# Patient Record
Sex: MALE | Race: WHITE | NOT HISPANIC OR LATINO | Employment: FULL TIME | URBAN - METROPOLITAN AREA
[De-identification: names, ages, dates, MRNs, and addresses within clinical notes are randomized per-mention and may not be internally consistent; named-entity substitution may affect disease eponyms.]

---

## 2018-03-08 ENCOUNTER — HOSPITAL ENCOUNTER (EMERGENCY)
Facility: CLINIC | Age: 33
Discharge: HOME | End: 2018-03-08
Attending: STUDENT IN AN ORGANIZED HEALTH CARE EDUCATION/TRAINING PROGRAM | Admitting: STUDENT IN AN ORGANIZED HEALTH CARE EDUCATION/TRAINING PROGRAM
Payer: COMMERCIAL

## 2018-03-08 ENCOUNTER — APPOINTMENT (OUTPATIENT)
Dept: MRI IMAGING | Facility: CLINIC | Age: 33
End: 2018-03-08
Attending: STUDENT IN AN ORGANIZED HEALTH CARE EDUCATION/TRAINING PROGRAM
Payer: COMMERCIAL

## 2018-03-08 VITALS
TEMPERATURE: 98.4 F | OXYGEN SATURATION: 97 % | WEIGHT: 185 LBS | HEART RATE: 58 BPM | SYSTOLIC BLOOD PRESSURE: 111 MMHG | RESPIRATION RATE: 16 BRPM | DIASTOLIC BLOOD PRESSURE: 72 MMHG

## 2018-03-08 DIAGNOSIS — R20.2 FACIAL PARESTHESIA: ICD-10-CM

## 2018-03-08 DIAGNOSIS — G44.89 OTHER HEADACHE SYNDROME: ICD-10-CM

## 2018-03-08 LAB
ANION GAP SERPL CALCULATED.3IONS-SCNC: 3 MMOL/L (ref 3–14)
BASOPHILS # BLD AUTO: 0.1 10E9/L (ref 0–0.2)
BASOPHILS NFR BLD AUTO: 0.8 %
BUN SERPL-MCNC: 14 MG/DL (ref 7–30)
CALCIUM SERPL-MCNC: 8.5 MG/DL (ref 8.5–10.1)
CHLORIDE SERPL-SCNC: 107 MMOL/L (ref 94–109)
CO2 SERPL-SCNC: 29 MMOL/L (ref 20–32)
CREAT SERPL-MCNC: 0.94 MG/DL (ref 0.66–1.25)
DIFFERENTIAL METHOD BLD: NORMAL
EOSINOPHIL # BLD AUTO: 0.1 10E9/L (ref 0–0.7)
EOSINOPHIL NFR BLD AUTO: 1.7 %
ERYTHROCYTE [DISTWIDTH] IN BLOOD BY AUTOMATED COUNT: 13.3 % (ref 10–15)
GFR SERPL CREATININE-BSD FRML MDRD: >90 ML/MIN/1.7M2
GLUCOSE BLDC GLUCOMTR-MCNC: 87 MG/DL (ref 70–99)
GLUCOSE SERPL-MCNC: 81 MG/DL (ref 70–99)
HCT VFR BLD AUTO: 42.2 % (ref 40–53)
HGB BLD-MCNC: 14.3 G/DL (ref 13.3–17.7)
IMM GRANULOCYTES # BLD: 0 10E9/L (ref 0–0.4)
IMM GRANULOCYTES NFR BLD: 0.2 %
LITHIUM SERPL-SCNC: <0.2 MMOL/L (ref 0.6–1.2)
LYMPHOCYTES # BLD AUTO: 2 10E9/L (ref 0.8–5.3)
LYMPHOCYTES NFR BLD AUTO: 31.5 %
MCH RBC QN AUTO: 28.8 PG (ref 26.5–33)
MCHC RBC AUTO-ENTMCNC: 33.9 G/DL (ref 31.5–36.5)
MCV RBC AUTO: 85 FL (ref 78–100)
MONOCYTES # BLD AUTO: 0.8 10E9/L (ref 0–1.3)
MONOCYTES NFR BLD AUTO: 11.7 %
NEUTROPHILS # BLD AUTO: 3.5 10E9/L (ref 1.6–8.3)
NEUTROPHILS NFR BLD AUTO: 54.1 %
PLATELET # BLD AUTO: 212 10E9/L (ref 150–450)
POTASSIUM SERPL-SCNC: 3.8 MMOL/L (ref 3.4–5.3)
RBC # BLD AUTO: 4.96 10E12/L (ref 4.4–5.9)
SODIUM SERPL-SCNC: 139 MMOL/L (ref 133–144)
WBC # BLD AUTO: 6.5 10E9/L (ref 4–11)

## 2018-03-08 PROCEDURE — 25000128 H RX IP 250 OP 636: Performed by: STUDENT IN AN ORGANIZED HEALTH CARE EDUCATION/TRAINING PROGRAM

## 2018-03-08 PROCEDURE — 96361 HYDRATE IV INFUSION ADD-ON: CPT | Performed by: STUDENT IN AN ORGANIZED HEALTH CARE EDUCATION/TRAINING PROGRAM

## 2018-03-08 PROCEDURE — 96374 THER/PROPH/DIAG INJ IV PUSH: CPT | Mod: 59 | Performed by: STUDENT IN AN ORGANIZED HEALTH CARE EDUCATION/TRAINING PROGRAM

## 2018-03-08 PROCEDURE — A9585 GADOBUTROL INJECTION: HCPCS | Performed by: STUDENT IN AN ORGANIZED HEALTH CARE EDUCATION/TRAINING PROGRAM

## 2018-03-08 PROCEDURE — 96375 TX/PRO/DX INJ NEW DRUG ADDON: CPT | Performed by: STUDENT IN AN ORGANIZED HEALTH CARE EDUCATION/TRAINING PROGRAM

## 2018-03-08 PROCEDURE — 70553 MRI BRAIN STEM W/O & W/DYE: CPT

## 2018-03-08 PROCEDURE — 85025 COMPLETE CBC W/AUTO DIFF WBC: CPT | Performed by: STUDENT IN AN ORGANIZED HEALTH CARE EDUCATION/TRAINING PROGRAM

## 2018-03-08 PROCEDURE — 99284 EMERGENCY DEPT VISIT MOD MDM: CPT | Mod: Z6 | Performed by: STUDENT IN AN ORGANIZED HEALTH CARE EDUCATION/TRAINING PROGRAM

## 2018-03-08 PROCEDURE — 80048 BASIC METABOLIC PNL TOTAL CA: CPT | Performed by: STUDENT IN AN ORGANIZED HEALTH CARE EDUCATION/TRAINING PROGRAM

## 2018-03-08 PROCEDURE — 00000146 ZZHCL STATISTIC GLUCOSE BY METER IP

## 2018-03-08 PROCEDURE — 80178 ASSAY OF LITHIUM: CPT | Performed by: STUDENT IN AN ORGANIZED HEALTH CARE EDUCATION/TRAINING PROGRAM

## 2018-03-08 PROCEDURE — 99285 EMERGENCY DEPT VISIT HI MDM: CPT | Mod: 25 | Performed by: STUDENT IN AN ORGANIZED HEALTH CARE EDUCATION/TRAINING PROGRAM

## 2018-03-08 RX ORDER — LITHIUM CARBONATE 450 MG
450 TABLET, EXTENDED RELEASE ORAL DAILY
COMMUNITY
End: 2021-05-10

## 2018-03-08 RX ORDER — DIPHENHYDRAMINE HYDROCHLORIDE 50 MG/ML
25 INJECTION INTRAMUSCULAR; INTRAVENOUS ONCE
Status: COMPLETED | OUTPATIENT
Start: 2018-03-08 | End: 2018-03-08

## 2018-03-08 RX ORDER — GADOBUTROL 604.72 MG/ML
8 INJECTION INTRAVENOUS ONCE
Status: COMPLETED | OUTPATIENT
Start: 2018-03-08 | End: 2018-03-08

## 2018-03-08 RX ORDER — KETOROLAC TROMETHAMINE 15 MG/ML
15 INJECTION, SOLUTION INTRAMUSCULAR; INTRAVENOUS ONCE
Status: COMPLETED | OUTPATIENT
Start: 2018-03-08 | End: 2018-03-08

## 2018-03-08 RX ADMIN — PROCHLORPERAZINE EDISYLATE 10 MG: 5 INJECTION INTRAMUSCULAR; INTRAVENOUS at 16:55

## 2018-03-08 RX ADMIN — GADOBUTROL 8 ML: 604.72 INJECTION INTRAVENOUS at 17:51

## 2018-03-08 RX ADMIN — SODIUM CHLORIDE, POTASSIUM CHLORIDE, SODIUM LACTATE AND CALCIUM CHLORIDE 1000 ML: 600; 310; 30; 20 INJECTION, SOLUTION INTRAVENOUS at 16:49

## 2018-03-08 RX ADMIN — KETOROLAC TROMETHAMINE 15 MG: 15 INJECTION, SOLUTION INTRAMUSCULAR; INTRAVENOUS at 16:49

## 2018-03-08 RX ADMIN — DIPHENHYDRAMINE HYDROCHLORIDE 25 MG: 50 INJECTION, SOLUTION INTRAMUSCULAR; INTRAVENOUS at 16:52

## 2018-03-08 NOTE — ED PROVIDER NOTES
History     Chief Complaint   Patient presents with     Numbness     right sided facial numbness since yesterday. no rash. headache     HPI  Albert Ray is a 32 year old male with past medical history which includes PTSD, bipolar disorder, and migraine headaches who presents for evaluation of headache with right-sided facial numbness.  Patient explains that since he returned home from the  he was diagnosed with migraine headaches, but has not had one in a few years.  His typical migraine headaches were achy and bilateral retro-orbital in location but never associated with sensory deficits or weakness.  He developed a headache yesterday behind both eyes similar to migraine headaches he had years ago but also has had developed diminished sensation along the right side of his face.  Patient also notes a twitching sensation along the right side of his cheek and mouth.  He has not taken anything today for his headache stating that it is only mild compared to previous headaches.  Patient denies fever, chills, visual deficits, neck pain, or other neurologic symptoms.      Problem List:    There are no active problems to display for this patient.       Past Medical History:    No past medical history on file.    Past Surgical History:    No past surgical history on file.    Family History:    No family history on file.    Social History:  Marital Status:  Single [1]  Social History   Substance Use Topics     Smoking status: Not on file     Smokeless tobacco: Not on file     Alcohol use Not on file        Medications:      lithium (ESKALITH) 450 MG CR tablet   IBUPROFEN PO         Review of Systems  Constitutional:  Negative for fever or recent illness.  Eye:  Negative for double vision or visual changes from baseline.  Respiratory:  Negative for cough or shortness of breath.  Gastrointestinal:  Negative for nausea or vomiting.  Musculoskeletal:  Negative for neck pain or recent injury.  Neurological: Positive  for achy bilateral retro-orbital headache with diminished sensation along right side of face.  Negative for weakness.  Skin:  Negative for rash.    All others reviewed and are negative.      Physical Exam   BP: 116/79  Pulse: 66  Heart Rate: 68  Temp: 98.4  F (36.9  C)  Resp: 18  Weight: 83.9 kg (185 lb)  SpO2: 99 %      Physical Exam  Constitutional:  Well developed, well nourished.  Appears nontoxic and in no acute distress.  Head:  Normocephalic and atraumatic.  Symmetrical in appearance.  No palpable temporal arteries.  Eyes:  Conjunctivae are normal.  EOMI and denies diplopia.  PERRLA.  Negative for nystagmus.   Ears:  Negative for tenderness of the auricle or tragus.  External auditory canal clear bilaterally and without discharge.  Tympanic membrane without erythema, purulence or bulging/retraction.   Neck:  Neck supple and without nuchal rigidity.  Cardiovascular: RRR.  Respiratory/Chest:  Effort normal, no respiratory distress.   Musculoskeletal:  Moves all extremities spontaneously and without complaint.  Neuro:  Patient is alert and oriented, ambulatory.  Diminished sensation along the right side of face as compared to the left.  Finger nose finger intact and equal bilaterally.  Negative for dysdiadochokinesia with rapid alternating finger movements.  Skin:  Skin is warm and dry, not diaphoretic.  Psych:  Appears to have a normal mood and affect.      ED Course     ED Course     Procedures              Critical Care time:  none               Results for orders placed or performed during the hospital encounter of 03/08/18 (from the past 24 hour(s))   Glucose by meter   Result Value Ref Range    Glucose 87 70 - 99 mg/dL   CBC with platelets differential   Result Value Ref Range    WBC 6.5 4.0 - 11.0 10e9/L    RBC Count 4.96 4.4 - 5.9 10e12/L    Hemoglobin 14.3 13.3 - 17.7 g/dL    Hematocrit 42.2 40.0 - 53.0 %    MCV 85 78 - 100 fl    MCH 28.8 26.5 - 33.0 pg    MCHC 33.9 31.5 - 36.5 g/dL    RDW 13.3 10.0 -  15.0 %    Platelet Count 212 150 - 450 10e9/L    Diff Method Automated Method     % Neutrophils 54.1 %    % Lymphocytes 31.5 %    % Monocytes 11.7 %    % Eosinophils 1.7 %    % Basophils 0.8 %    % Immature Granulocytes 0.2 %    Absolute Neutrophil 3.5 1.6 - 8.3 10e9/L    Absolute Lymphocytes 2.0 0.8 - 5.3 10e9/L    Absolute Monocytes 0.8 0.0 - 1.3 10e9/L    Absolute Eosinophils 0.1 0.0 - 0.7 10e9/L    Absolute Basophils 0.1 0.0 - 0.2 10e9/L    Abs Immature Granulocytes 0.0 0 - 0.4 10e9/L   Basic metabolic panel   Result Value Ref Range    Sodium 139 133 - 144 mmol/L    Potassium 3.8 3.4 - 5.3 mmol/L    Chloride 107 94 - 109 mmol/L    Carbon Dioxide 29 20 - 32 mmol/L    Anion Gap 3 3 - 14 mmol/L    Glucose 81 70 - 99 mg/dL    Urea Nitrogen 14 7 - 30 mg/dL    Creatinine 0.94 0.66 - 1.25 mg/dL    GFR Estimate >90 >60 mL/min/1.7m2    GFR Estimate If Black >90 >60 mL/min/1.7m2    Calcium 8.5 8.5 - 10.1 mg/dL   Lithium level   Result Value Ref Range    Lithium Level <0.20 (L) 0.60 - 1.20 mmol/L   MR Brain w/o & w Contrast    Narrative    MRI BRAIN WITHOUT AND WITH CONTRAST  3/8/2018 5:51 PM    HISTORY: Right-sided facial paresthesias, headache.    TECHNIQUE:  Multiplanar, multisequence MRI of the brain without and  with 8mL Gadavist.    COMPARISON: None.    FINDINGS: There is no evidence of hemorrhage, mass, acute infarct, or  anomaly. The brain parenchyma, ventricles and subarachnoid spaces  appear normal.     There is no abnormal intracranial enhancement.     The facial structures appear normal. The arteries at the base of the  brain and the dural venous sinuses appear patent.      Impression    IMPRESSION:  Normal MRI of the brain.      CHELSEA ELIZABETH MD       Assessments & Plan (with Medical Decision Making)   Albert Ray is a 32 year old male who presents to the department complaining of retro-orbital headache consistent with mild migraine headaches which he has suffered from in the past, however he has also  "developed a sensation along the right side of his face as compared to the left.  Comorbidities include bipolar disorder and migraine headaches, but no formal diagnosis of complex migraines or neurologic deficits.  Differential diagnosis included ischemic stroke, multiple sclerosis, peripheral nerve palsy, and migraine headache.  MRI imaging was ordered and without sign of infarct, mass, or white matter changes.  He received a \"migraine cocktail\" in the department and headache completely resolved but admits that right facial symptoms remain.  He does have sensation along the right side of his face but believes it is diminished relative to the left.  He has no sign of Bell's palsy or Abiodun's Hunt.  It is possible that his symptoms are related to his migraine headache.  Recommend close monitoring for developing symptoms and also plan for follow-up with primary care provider for reevaluation.  He was also given contact information for neurology clinic.      Disclaimer:  This note consists of symbols derived from keyboarding, dictation, and/or voice recognition software.  As a result, there may be errors in the script that have gone undetected.  Please consider this when interpreting information found in the chart.        I have reviewed the nursing notes.    I have reviewed the findings, diagnosis, plan and need for follow up with the patient.       Discharge Medication List as of 3/8/2018  7:18 PM          Final diagnoses:   Other headache syndrome   Facial paresthesia       3/8/2018   [facility] EMERGENCY DEPARTMENT     Godwin Villagomez DO  03/08/18 2329    "

## 2018-03-08 NOTE — ED AVS SNAPSHOT
[facility] Emergency Department    [address] [address]    Phone:  [phone]    Fax:  [phone]                                       Albert Ray   MRN: [id number]    Department:  [facility] Emergency Department   Date of Visit:  3/8/2018           After Visit Summary Signature Page     I have received my discharge instructions, and my questions have been answered. I have discussed any challenges I see with this plan with the nurse or doctor.    ..........................................................................................................................................  Patient/Patient Representative Signature      ..........................................................................................................................................  Patient Representative Print Name and Relationship to Patient    ..................................................               ................................................  Date                                            Time    ..........................................................................................................................................  Reviewed by Signature/Title    ...................................................              ..............................................  Date                                                            Time

## 2018-03-08 NOTE — ED NOTES
Pt has had a headache behind his eyes (pressure) that started yesterday, rates pain 3/10 with nausea. BS: 87. Has HX of migraines but this feels different with it being in his eyes. Has tried ibuprofen with no help. Pt is a/o x 4. Having tingling on rt side of face and numbness. Is having sharp pain down his legs but feels this is from his back which is not new for him. Denies fever/chills, cough/SOB or CP. Is eating and drinking normal

## 2018-03-08 NOTE — ED AVS SNAPSHOT
[facility] Emergency Department    [address] [address]    Phone:  [phone]    Fax:  [phone]                                       Albert Ray   MRN: [id number]    Department:  [facility] Emergency Department   Date of Visit:  3/8/2018           Patient Information     Date Of Birth          1985        Your diagnoses for this visit were:     Other headache syndrome     Facial paresthesia        You were seen by Godwin Villagomez DO.      Follow-up Information     Follow up with [facility]. Schedule an appointment as soon as possible for a visit in 3 days.    Specialty:  Neurology    Why:  Followup for reevaluation if symptoms persist.    Contact information:    [address] [place] [id number] [phone]    Additional information:    The medical center is located at   [address] [place] [address] (between [facility] and   [facility] in [place], four miles north   of [place]).        Schedule an appointment as soon as possible for a visit with [facility].    Why:  Follow-up for reevaluation if unable to be seen by neurology clinic.    Contact information:    [address] [place] [address] [phone]        Discharge Instructions          * HEADACHE [unspecified]    The cause of your headache today is not clear, but it does not appear to be the sign of any serious illness.  Under stress, some people tense the muscles of their shoulder, neck and scalp without knowing it. If this condition lasts long enough, a TENSION HEADACHE can occur.  A MIGRAINE HEADACHE is caused by changes in blood flow to the brain. It can be mild or severe. A migraine attack may be triggered by emotional stress, hormone changes during the menstrual cycle, oral contraceptives, alcohol use, certain foods containing tyramine, eye strain, weather changes, missing meals, lack of sleep or oversleeping.  Other causes of headache include a  viral illness, sinus, ear or throat infection, dental pain and TMJ (jaw joint) pain.  HOME CARE:    If you were given pain medicine for this headache, do not drive yourself home. Arrange for a ride, instead. When you get home, try to sleep. You should feel much better when you wake up.    If you are having nausea or vomiting, follow a light diet until your headache is relieved.    If you have a migraine type headache, use sunglasses when in the daylight or around bright indoor lighting until symptoms improve. Bright glaring light can worsen this kind of headache.  FOLLOW UP with your doctor if the headache is not better within the next 24 hours. If you have frequent headaches you should discuss a treatment plan with your primary care doctor. By being aware of the earliest signs of headache, and starting treatment right away, you may be able to stop the pain yourself.  GET PROMPT MEDICAL ATTENTION if any of the following occur:    Worsening of your head pain or no improvement within 24 hours    Repeated vomiting (unable to keep liquids down)    Fever over 101 F (38.3 C)    Stiff neck    Extreme drowsiness, confusion or fainting    Weakness of an arm or leg or one side of the face    Difficulty with speech or vision    0772-9482 [employer]. [address] [place] [address], [place] [address]. All rights reserved. This information is not intended as a substitute for professional medical care. Always follow your healthcare professional's instructions.  This information has been modified by your health care provider with permission from the publisher.      24 Hour Appointment Hotline       To make an appointment at any [facility], call [phone] ([phone]). If you don't have a family doctor or clinic, we will help you find one. [facility] clinics are conveniently located to serve the needs of you and your family.             Review of your medicines      Our records show that you are taking the  medicines listed below. If these are incorrect, please call your family doctor or clinic.        Dose / Directions Last dose taken    IBUPROFEN PO   Dose:  400 mg        Take 400 mg by mouth every 6 hours as needed for moderate pain   Refills:  0        lithium 450 MG CR tablet   Commonly known as:  ESKALITH   Dose:  450 mg        Take 450 mg by mouth daily   Refills:  0                Procedures and tests performed during your visit     Basic metabolic panel    CBC with platelets differential    Glucose by meter    Lithium level    MR Brain w/o & w Contrast    Peripheral IV catheter    Peripheral IV: Standard      Orders Needing Specimen Collection     None      Pending Results     No orders found from 3/6/2018 to 3/9/2018.            Pending Culture Results     No orders found from 3/6/2018 to 3/9/2018.            Pending Results Instructions     If you had any lab results that were not finalized at the time of your Discharge, you can call the ED Lab Result RN at [phone]. You will be contacted by this team for any positive Lab results or changes in treatment. The nurses are available 7 days a week from 10A to 6:30P.  You can leave a message 24 hours per day and they will return your call.        Test Results From Your Hospital Stay        3/8/2018  4:00 PM      Component Results     Component Value Ref Range & Units Status    WBC 6.5 4.0 - 11.0 10e9/L Final    RBC Count 4.96 4.4 - 5.9 10e12/L Final    Hemoglobin 14.3 13.3 - 17.7 g/dL Final    Hematocrit 42.2 40.0 - 53.0 % Final    MCV 85 78 - 100 fl Final    MCH 28.8 26.5 - 33.0 pg Final    MCHC 33.9 31.5 - 36.5 g/dL Final    RDW 13.3 10.0 - 15.0 % Final    Platelet Count 212 150 - 450 10e9/L Final    Diff Method Automated Method  Final    % Neutrophils 54.1 % Final    % Lymphocytes 31.5 % Final    % Monocytes 11.7 % Final    % Eosinophils 1.7 % Final    % Basophils 0.8 % Final    % Immature Granulocytes 0.2 % Final    Absolute Neutrophil 3.5 1.6 - 8.3 10e9/L  Final    Absolute Lymphocytes 2.0 0.8 - 5.3 10e9/L Final    Absolute Monocytes 0.8 0.0 - 1.3 10e9/L Final    Absolute Eosinophils 0.1 0.0 - 0.7 10e9/L Final    Absolute Basophils 0.1 0.0 - 0.2 10e9/L Final    Abs Immature Granulocytes 0.0 0 - 0.4 10e9/L Final         3/8/2018  4:10 PM      Component Results     Component Value Ref Range & Units Status    Sodium 139 133 - 144 mmol/L Final    Potassium 3.8 3.4 - 5.3 mmol/L Final    Chloride 107 94 - 109 mmol/L Final    Carbon Dioxide 29 20 - 32 mmol/L Final    Anion Gap 3 3 - 14 mmol/L Final    Glucose 81 70 - 99 mg/dL Final    Urea Nitrogen 14 7 - 30 mg/dL Final    Creatinine 0.94 0.66 - 1.25 mg/dL Final    GFR Estimate >90 >60 mL/min/1.7m2 Final    Non  GFR Calc    GFR Estimate If Black >90 >60 mL/min/1.7m2 Final    African American GFR Calc    Calcium 8.5 8.5 - 10.1 mg/dL Final         3/8/2018  3:57 PM      Component Results     Component Value Ref Range & Units Status    Glucose 87 70 - 99 mg/dL Final         3/8/2018  6:48 PM      Narrative     MRI BRAIN WITHOUT AND WITH CONTRAST  3/8/2018 5:51 PM    HISTORY: Right-sided facial paresthesias, headache.    TECHNIQUE:  Multiplanar, multisequence MRI of the brain without and  with 8mL Gadavist.    COMPARISON: None.    FINDINGS: There is no evidence of hemorrhage, mass, acute infarct, or  anomaly. The brain parenchyma, ventricles and subarachnoid spaces  appear normal.     There is no abnormal intracranial enhancement.     The facial structures appear normal. The arteries at the base of the  brain and the dural venous sinuses appear patent.        Impression     IMPRESSION:  Normal MRI of the brain.      CHELSEA ELIZABETH MD         3/8/2018  4:56 PM      Component Results     Component Value Ref Range & Units Status    Lithium Level <0.20 (L) 0.60 - 1.20 mmol/L Final                Thank you for choosing [facility]       Thank you for choosing [facility] for your care. Our goal is always to provide you with  "excellent care. Hearing back from our patients is one way we can continue to improve our services. Please take a few minutes to complete the written survey that you may receive in the mail after you visit with us. Thank you!        [employer] Information     [employer] lets you send messages to your doctor, view your test results, renew your prescriptions, schedule appointments and more. To sign up, go to [email or web address] . Click on \"Log in\" on the left side of the screen, which will take you to the Welcome page. Then click on \"Sign up Now\" on the right side of the page.     You will be asked to enter the access code listed below, as well as some personal information. Please follow the directions to create your username and password.     Your access code is: [email or web address]  Expires: 2018  7:18 PM     Your access code will  in 90 days. If you need help or a new code, please call your [facility] clinic or [phone].        Care EveryWhere ID     This is your Care EveryWhere ID. This could be used by other organizations to access your [facility] medical records  [id number]        Equal Access to Services     JOSHUA ADAME AH: Hadii annamaria Bah, warene baum, qavan kaalmadavid rene, angela potts. So [facility] [phone].    ATENCIÓN: Si habla español, tiene a cochran disposición servicios gratuitos de asistencia lingüística. Cheryl al [phone].    We comply with applicable federal civil rights laws and [place] laws. We do not discriminate on the basis of race, color, national origin, age, disability, sex, sexual orientation, or gender identity.            After Visit Summary       This is your record. Keep this with you and show to your community pharmacist(s) and doctor(s) at your next visit.                  "

## 2018-03-09 NOTE — DISCHARGE INSTRUCTIONS
* HEADACHE [unspecified]    The cause of your headache today is not clear, but it does not appear to be the sign of any serious illness.  Under stress, some people tense the muscles of their shoulder, neck and scalp without knowing it. If this condition lasts long enough, a TENSION HEADACHE can occur.  A MIGRAINE HEADACHE is caused by changes in blood flow to the brain. It can be mild or severe. A migraine attack may be triggered by emotional stress, hormone changes during the menstrual cycle, oral contraceptives, alcohol use, certain foods containing tyramine, eye strain, weather changes, missing meals, lack of sleep or oversleeping.  Other causes of headache include a viral illness, sinus, ear or throat infection, dental pain and TMJ (jaw joint) pain.  HOME CARE:    If you were given pain medicine for this headache, do not drive yourself home. Arrange for a ride, instead. When you get home, try to sleep. You should feel much better when you wake up.    If you are having nausea or vomiting, follow a light diet until your headache is relieved.    If you have a migraine type headache, use sunglasses when in the daylight or around bright indoor lighting until symptoms improve. Bright glaring light can worsen this kind of headache.  FOLLOW UP with your doctor if the headache is not better within the next 24 hours. If you have frequent headaches you should discuss a treatment plan with your primary care doctor. By being aware of the earliest signs of headache, and starting treatment right away, you may be able to stop the pain yourself.  GET PROMPT MEDICAL ATTENTION if any of the following occur:    Worsening of your head pain or no improvement within 24 hours    Repeated vomiting (unable to keep liquids down)    Fever over 101 F (38.3 C)    Stiff neck    Extreme drowsiness, confusion or fainting    Weakness of an arm or leg or one side of the face    Difficulty with speech or vision    [id number] [facility]  [employer]. [address] [place] [address]. All rights reserved. This information is not intended as a substitute for professional medical care. Always follow your healthcare professional's instructions.  This information has been modified by your health care provider with permission from the publisher.

## 2018-04-30 ENCOUNTER — OFFICE VISIT (OUTPATIENT)
Dept: FAMILY MEDICINE | Facility: CLINIC | Age: 33
End: 2018-04-30
Payer: COMMERCIAL

## 2018-04-30 VITALS
DIASTOLIC BLOOD PRESSURE: 69 MMHG | SYSTOLIC BLOOD PRESSURE: 102 MMHG | BODY MASS INDEX: 26.01 KG/M2 | WEIGHT: 192 LBS | HEART RATE: 66 BPM | TEMPERATURE: 97.2 F | HEIGHT: 72 IN

## 2018-04-30 DIAGNOSIS — Z30.40 ENCOUNTER FOR SURVEILLANCE OF CONTRACEPTIVES, UNSPECIFIED CONTRACEPTIVE: Primary | ICD-10-CM

## 2018-04-30 PROCEDURE — 99213 OFFICE O/P EST LOW 20 MIN: CPT | Performed by: FAMILY MEDICINE

## 2018-04-30 NOTE — PROGRESS NOTES
SUBJECTIVE:   Albert Ray is a 32 year old male who presents to clinic today for the following health issues:      Chief Complaint   Patient presents with     Consult     Patient is here for a vasectomy consultation.         Current Outpatient Prescriptions:      IBUPROFEN PO, Take 400 mg by mouth every 6 hours as needed for moderate pain, Disp: , Rfl:      lithium (ESKALITH) 450 MG CR tablet, Take 450 mg by mouth daily, Disp: , Rfl:     There is no problem list on file for this patient.      Current Outpatient Prescriptions:      IBUPROFEN PO, Take 400 mg by mouth every 6 hours as needed for moderate pain, Disp: , Rfl:      lithium (ESKALITH) 450 MG CR tablet, Take 450 mg by mouth daily, Disp: , Rfl:       SUBJECTIVE:  This 32 year old male is in for a vasectomy consultation.  He and his partner have decided they don't want to have any more children. They have decided that he will have the sterilization procedure instead of her.  Patient was given information to read prior to the consultation.      No surgery on the groin. No allergies to iodine or Lidocaine. No bleeding disorders for him or in the family. He is  and his wife agrees.     We talked about the risks and benefits of the vasectomy; risks being that of potential for bleeding, infection, postoperative discomfort immediately which can last for a number of weeks afterwards, also the development of spermatoceles or sperm granulomas, epididymal cysts and the possibility of failure of the procedure producing failed attempt at sterility.     Also mentioned to the patient that there is some literature out there that suggests men who have vasectomies are at increased risk for prostate cancer; however, this literature is inconclusive and controversial.  It is recommended that men who have vasectomies should have annual prostate exams through the rectum yearly after age 40 and also may benefit from a screening prostatic specific antigen test after age  "40.  We also discussed signs and symptoms of prostatic enlargement or prostatic problems.     I went through the procedure with the patient, how it is done, a midline incision in the scrotum measuring approximately 1/2 inch in length.  The vas deferens is brought up through this incision, dissected free and a small portion, maybe 0.5 cm. in length is removed.  Each end is tied with an absorbable suture, cauterized and then the proximal or distal end is buried away from the other.  Patient had no questions when it came to the procedure itself.  I did show him pictures and diagrams of the procedure.  I showed him where a potential spermatocele or sperm granuloma can occur.      Again, the patient had no further questions for me.    OBJECTIVE: Blood pressure 102/69, pulse 66, temperature 97.2  F (36.2  C), temperature source Tympanic, height 5' 11.5\" (1.816 m), weight 192 lb (87.1 kg).    On exam, this is a well-developed, well-nourished white male.      Exam reveals a normal circumcised penis, no lesions.  Testes are descended bilaterally.  Exam was limited to the genitalia.  Both vas deferens were easily identified.  No other abnormalities were noted.      ASSESSMENT:  Undesired fertility in an adult male, requesting vasectomy.    PLAN:  He will schedule a vasectomy at his convenience for either the last appointment of the morning or on a Thursday or Friday afternoon.  He is aware that he will need to take the entire weekend off and have essentially bedrest for two days with ice packs every two hours and ibuprofen for discomfort.   If he has any further questions, he will contact me prior to the procedure or ask me on the day of the procedure.  He also is aware that he will need to bring a specimen after 10-12 ejaculations to make sure that he has cleared the storage vesicles of any residual sperm and to make sure that he is sterile.       Humberto Scott                "

## 2018-04-30 NOTE — PATIENT INSTRUCTIONS
Thank you for choosing [facility].  You may be receiving a survey in the mail from [employer]ie [employer] regarding your visit today.  Please take a few minutes to complete and return the survey to let us know how we are doing.      If you have questions or concerns, please contact us via MapMyID or you can contact your care team at [phone].    Our Clinic hours are:  Monday 6:40 am  to 7:00 pm  Tuesday -Friday 6:40 am to 5:00 pm    The [place] outpatient lab hours are:  Monday - Friday 6:10 am to 4:45 pm  Saturdays 7:00 am to 11:00 am  Appointments are required, call [phone]    If you have clinical questions after hours or would like to schedule an appointment,  call the clinic at [phone].      ASSESSMENT:  Undesired fertility in an adult male, requesting vasectomy.    PLAN:  He will schedule a vasectomy at his convenience for either the last appointment of the morning or on a Thursday or Friday afternoon.  He is aware that he will need to take the entire weekend off and have essentially bedrest for two days with ice packs every two hours and ibuprofen for discomfort.   If he has any further questions, he will contact me prior to the procedure or ask me on the day of the procedure.  He also is aware that he will need to bring a specimen after 10-12 ejaculations to make sure that he has cleared the storage vesicles of any residual sperm and to make sure that he is sterile.

## 2018-04-30 NOTE — MR AVS SNAPSHOT
After Visit Summary   4/30/2018    Albert Ray    MRN: [id number]           Patient Information     Date Of Birth          1985        Visit Information        Provider Department      4/30/2018 7:00 AM Humberto Scott MD [facility]        Today's Diagnoses     Encounter for surveillance of contraceptives, unspecified contraceptive    -  1      Care Instructions          Thank you for choosing [facility].  You may be receiving a survey in the mail from [facility] regarding your visit today.  Please take a few minutes to complete and return the survey to let us know how we are doing.      If you have questions or concerns, please contact us via PredictAd or you can contact your care team at [phone].    Our Clinic hours are:  Monday 6:40 am  to 7:00 pm  Tuesday -Friday 6:40 am to 5:00 pm    The [place] outpatient lab hours are:  Monday - Friday 6:10 am to 4:45 pm  Saturdays 7:00 am to 11:00 am  Appointments are required, call [phone]    If you have clinical questions after hours or would like to schedule an appointment,  call the clinic at [phone].      ASSESSMENT:  Undesired fertility in an adult male, requesting vasectomy.    PLAN:  He will schedule a vasectomy at his convenience for either the last appointment of the morning or on a Thursday or Friday afternoon.  He is aware that he will need to take the entire weekend off and have essentially bedrest for two days with ice packs every two hours and ibuprofen for discomfort.   If he has any further questions, he will contact me prior to the procedure or ask me on the day of the procedure.  He also is aware that he will need to bring a specimen after 10-12 ejaculations to make sure that he has cleared the storage vesicles of any residual sperm and to make sure that he is sterile.               Follow-ups after your visit        Who to contact     If you have questions or need follow up information about  "today's clinic visit or your schedule please contact [facility] directly at [phone].  Normal or non-critical lab and imaging results will be communicated to you by MyChart, letter or phone within 4 business days after the clinic has received the results. If you do not hear from us within 7 days, please contact the clinic through [employer]eg[employer] System[employer]art or phone. If you have a critical or abnormal lab result, we will notify you by phone as soon as possible.  Submit refill requests through [employer]kids or call your pharmacy and they will forward the refill request to us. Please allow 3 business days for your refill to be completed.          Additional Information About Your Visit        Teg[employer]art Information     [employer] lets you send messages to your doctor, view your test results, renew your prescriptions, schedule appointments and more. To sign up, go to [email or web address] . Click on \"Log in\" on the left side of the screen, which will take you to the Welcome page. Then click on \"Sign up Now\" on the right side of the page.     You will be asked to enter the access code listed below, as well as some personal information. Please follow the directions to create your username and password.     Your access code is: [email or web address]  Expires: 2018  8:18 PM     Your access code will  in 90 days. If you need help or a new code, please call your [place] clinic or [phone].        Care EveryWhere ID     This is your Care EveryWhere ID. This could be used by other organizations to access your [place] medical records  [id number]        Your Vitals Were     Pulse Temperature Height BMI (Body Mass Index)          66 97.2  F (36.2  C) (Tympanic) 5' 11.5\" (1.816 m) 26.41 kg/m2         Blood Pressure from Last 3 Encounters:   18 102/69   18 111/72    Weight from Last 3 Encounters:   18 192 lb (87.1 kg)   18 185 lb (83.9 kg)              Today, you had the following     No orders found for " display       Primary Care Provider Fax #    Physician No Ref-Primary [phone]       No address on file        Equal Access to Services     ESTEPHANIA ADAME : Hadii aad ku haddavidsonmichael Bah, beckydavid vazquezsamirha, mike maryannmerledavid dominguezangeldavid, waxtonja jose alfredoin hayaaalyssa dominguezankita enzocourtneyrosalia potts. So [facility] [phone].    ATENCIÓN: Si habla español, tiene a cochran disposición servicios gratuitos de asistencia lingüística. Llame al [phone].    We comply with applicable federal civil rights laws and [place] laws. We do not discriminate on the basis of race, color, national origin, age, disability, sex, sexual orientation, or gender identity.            Thank you!     Thank you for choosing [facility]  for your care. Our goal is always to provide you with excellent care. Hearing back from our patients is one way we can continue to improve our services. Please take a few minutes to complete the written survey that you may receive in the mail after your visit with us. Thank you!             Your Updated Medication List - Protect others around you: Learn how to safely use, store and throw away your medicines at www.disposemymeds.org.          This list is accurate as of 4/30/18  7:29 AM.  Always use your most recent med list.                   Brand Name Dispense Instructions for use Diagnosis    IBUPROFEN PO      Take 400 mg by mouth every 6 hours as needed for moderate pain        lithium 450 MG CR tablet    ESKALITH     Take 450 mg by mouth daily

## 2018-04-30 NOTE — NURSING NOTE
"Chief Complaint   Patient presents with     Consult     Patient is here for a vasectomy consultation.       Initial /69  Pulse 66  Temp 97.2  F (36.2  C) (Tympanic)  Ht 5' 11.5\" (1.816 m)  Wt 192 lb (87.1 kg)  BMI 26.41 kg/m2 Estimated body mass index is 26.41 kg/(m^2) as calculated from the following:    Height as of this encounter: 5' 11.5\" (1.816 m).    Weight as of this encounter: 192 lb (87.1 kg).  Medication Reconciliation: complete  "

## 2018-05-17 ENCOUNTER — OFFICE VISIT (OUTPATIENT)
Dept: FAMILY MEDICINE | Facility: CLINIC | Age: 33
End: 2018-05-17
Payer: COMMERCIAL

## 2018-05-17 VITALS
WEIGHT: 192 LBS | SYSTOLIC BLOOD PRESSURE: 109 MMHG | HEIGHT: 72 IN | BODY MASS INDEX: 26.01 KG/M2 | HEART RATE: 65 BPM | DIASTOLIC BLOOD PRESSURE: 67 MMHG | TEMPERATURE: 97.9 F

## 2018-05-17 DIAGNOSIS — Z30.2 ENCOUNTER FOR STERILIZATION: Primary | ICD-10-CM

## 2018-05-17 PROCEDURE — 55250 REMOVAL OF SPERM DUCT(S): CPT | Performed by: FAMILY MEDICINE

## 2018-05-17 PROCEDURE — 88302 TISSUE EXAM BY PATHOLOGIST: CPT | Performed by: FAMILY MEDICINE

## 2018-05-17 NOTE — PATIENT INSTRUCTIONS
Thank you for choosing [facility].  You may be receiving a survey in the mail from Brandie Umana regarding your visit today.  Please take a few minutes to complete and return the survey to let us know how we are doing.      If you have questions or concerns, please contact us via Shopmium or you can contact your care team at [phone].    Our Clinic hours are:  Monday 6:40 am  to 7:00 pm  Tuesday -Friday 6:40 am to 5:00 pm    The [place] outpatient lab hours are:  Monday - Friday 6:10 am to 4:45 pm  Saturdays 7:00 am to 11:00 am  Appointments are required, call [phone]    If you have clinical questions after hours or would like to schedule an appointment,  call the clinic at [phone].

## 2018-05-17 NOTE — MR AVS SNAPSHOT
After Visit Summary   5/17/2018    Albert Ray    MRN: [id number]           Patient Information     Date Of Birth          1985        Visit Information        Provider Department      5/17/2018 3:00 PM Humberto Scott MD [facility]        Today's Diagnoses     Encounter for sterilization    -  1      Care Instructions          Thank you for choosing [facility].  You may be receiving a survey in the mail from [facility] regarding your visit today.  Please take a few minutes to complete and return the survey to let us know how we are doing.      If you have questions or concerns, please contact us via "[employer] or you can contact your care team at [phone].    Our Clinic hours are:  Monday 6:40 am  to 7:00 pm  Tuesday -Friday 6:40 am to 5:00 pm    The [place] outpatient lab hours are:  Monday - Friday 6:10 am to 4:45 pm  Saturdays 7:00 am to 11:00 am  Appointments are required, call [phone]    If you have clinical questions after hours or would like to schedule an appointment,  call the clinic at [phone].            Follow-ups after your visit        Future tests that were ordered for you today     Open Future Orders        Priority Expected Expires Ordered    Semen Analysis Post Vasectomy Routine  9/6/2018 5/17/2018            Who to contact     If you have questions or need follow up information about today's clinic visit or your schedule please contact [facility] directly at [phone].  Normal or non-critical lab and imaging results will be communicated to you by MyChart, letter or phone within 4 business days after the clinic has received the results. If you do not hear from us within 7 days, please contact the clinic through [employer] or phone. If you have a critical or abnormal lab result, we will notify you by phone as soon as possible.  Submit refill requests through "[employer] or call your pharmacy and they will forward the refill  "request to us. Please allow 3 business days for your refill to be completed.          Additional Information About Your Visit        [employer] Information     [email or web address] lets you send messages to your doctor, view your test results, renew your prescriptions, schedule appointments and more. To sign up, go to [email or web address] . Click on \"Log in\" on the left side of the screen, which will take you to the Welcome page. Then click on \"Sign up Now\" on the right side of the page.     You will be asked to enter the access code listed below, as well as some personal information. Please follow the directions to create your username and password.     Your access code is: [email or web address]  Expires: 2018  8:18 PM     Your access code will  in 90 days. If you need help or a new code, please call your [place] clinic or [phone].        Care EveryWhere ID     This is your Care EveryWhere ID. This could be used by other organizations to access your [place] medical records  [id number]        Your Vitals Were     Pulse Temperature Height BMI (Body Mass Index)          65 97.9  F (36.6  C) (Tympanic) 5' 11.5\" (1.816 m) 26.41 kg/m2         Blood Pressure from Last 3 Encounters:   18 109/67   18 102/69   18 111/72    Weight from Last 3 Encounters:   18 192 lb (87.1 kg)   18 192 lb (87.1 kg)   18 185 lb (83.9 kg)              We Performed the Following     Surgical pathology exam     VASECTOMY UNILAT/BILAT W POSTOP SEMEN        Primary Care Provider Fax #    Physician No Ref-Primary [phone]       No address on file        Equal Access to Services     ESTEPHANIA ADAME : Eugenio Bah, nicolas baum, mike rene, angela potts. So [facility] [phone].    ATENCIÓN: Si habla español, tiene a cochran disposición servicios gratuitos de asistencia lingüística. Llame al [phone].    We comply with applicable federal civil rights laws and " [place] laws. We do not discriminate on the basis of race, color, national origin, age, disability, sex, sexual orientation, or gender identity.            Thank you!     Thank you for choosing [facility]  for your care. Our goal is always to provide you with excellent care. Hearing back from our patients is one way we can continue to improve our services. Please take a few minutes to complete the written survey that you may receive in the mail after your visit with us. Thank you!             Your Updated Medication List - Protect others around you: Learn how to safely use, store and throw away your medicines at www.disposemymeds.org.          This list is accurate as of 5/17/18  4:06 PM.  Always use your most recent med list.                   Brand Name Dispense Instructions for use Diagnosis    IBUPROFEN PO      Take 400 mg by mouth every 6 hours as needed for moderate pain        lithium 450 MG CR tablet    ESKALITH     Take 450 mg by mouth daily

## 2018-05-17 NOTE — LETTER
"May 21, 2018      Niranjan Ray  [address]  [place] [address]        Dear ,    We are writing to inform you of your test results.    Your test results fall within the expected range(s) or remain unchanged from previous results.  Please continue with current treatment plan.    Resulted Orders   Surgical pathology exam   Result Value Ref Range    Copath Report       Patient Name: NIRANJAN RAY  MR#: [id number]  Specimen #: [id number]  Collected: 5/17/2018  Received: 5/18/2018  Reported: 5/21/2018 10:01  Ordering Phy(s): ORLANDO LOPEZ    For improved result formatting, select 'View Enhanced Report Format' under   Linked Documents section.    SPECIMEN(S):  Vas deferens, left and right    FINAL DIAGNOSIS:  Vas deferens, right, left, bilateral vasectomy:  - Bilateral vasa differentia with no pathologic changes  - Complete cross sections visualized bilaterally.    Electronically signed out by:    Osvaldo Russo M.D., PhD    CLINICAL HISTORY:  32 year old male.    GROSS:  The specimen is received in formalin, labeled with the patient's name and   date of birth, and designated \"vas  deferens right and left\". It consists of two cylindrical fragments of tan   soft tissue, measuring 0.6 cm in  length x 0.2 cm in diameter and 0.4 cm in length x 0.2 cm in diameter.    The larger specimen is inked blue as  an identification marker and to assist in orient ation.  Summary of   cassettes:    1 - larger blue ink fragment  2 - smaller fragment (Dictated by: Karolina Cunningham 5/18/2018 10:34 AM)    MICROSCOPIC:  Microscopic examination is performed.    CPT Codes:  A: 86252-NW7    TESTING LAB LOCATION:  [address] [id number] [phone]    COLLECTION SITE:  Client: [facility]  Location: [facility] ()         If you have any questions or concerns, please call the clinic at the number listed above. "       Sincerely,        Humberto Scott MD

## 2018-05-17 NOTE — PROGRESS NOTES
SUBJECTIVE:   Albert Ray is a 32 year old male who presents to clinic today for the following health issues:      Chief Complaint   Patient presents with     Vasectomy     Patient is here for the procedure vasectomy.  Consult was done on 4-30-18.       Current Outpatient Prescriptions:      IBUPROFEN PO, Take 400 mg by mouth every 6 hours as needed for moderate pain, Disp: , Rfl:      lithium (ESKALITH) 450 MG CR tablet, Take 450 mg by mouth daily, Disp: , Rfl:     There is no problem list on file for this patient.      SUBJECTIVE:  Informed consent was obtained.  An opportunity for questions was given, these were answered to his satisfaction.  Vasectomy literature was reviewed at his consult and post vas instruction sheets have also been reviewed.  He knows that it is meant to be a permanent procedure and that he needs to bring in a post vas specimen in eight to twelve weeks after twelve to fifteen ejaculations and have a negative semen analysis with no sperm seen before he can be considered sterile.  He also knows that he has to bring in a sample in one year. He wishes to proceed.  He did shave the night before.     SEDATION:  none    PROCEDURE:  The penis was taped up on to the abdomen.  The shave was adequate. The scrotum was prepped with Betadine and draped in a sterile fashion.  Attention was turned to the right vas; this was palpated and the area infiltrated with 1% Lidocaine plain.  A 1 cm. incision was made in the skin and blunt dissection carried out through the subcutaneous tissue.  The vas was grasped with the vas clamp and brought to the surface.  The birdie vas tissue was then dissected free.  The vas was then doubly clamped and a 3 mm. section excised.  This was sent to Pathology for confirmation.  The ends were then cauterized and the proximal end buried with a pursestring suture using 4-0 Vicryl.  Any bleeders were controlled with the pursestring suture and/or cautery. The ends were inspected  and hemostasis was deemed to be adequate.  The two ends were then delivered back into the scrotum and the wound was dressed.  Attention was then turned to the left vas and a similar procedure was carried out.    Specimens sent of the right and left vas.     COMPLICATIONS:  none    PLAN:  He is going to take it easy over the next couple of days.  He will follow the instructions on his post vas instruction sheet.    MEDICATIONS:  none    ORLANDO LOPEZ MD

## 2018-05-21 LAB — COPATH REPORT: NORMAL

## 2018-08-09 DIAGNOSIS — Z30.2 ENCOUNTER FOR STERILIZATION: ICD-10-CM

## 2018-08-09 LAB — SEMEN ANALYSIS P VAS PNL: ABNORMAL

## 2018-08-09 PROCEDURE — 89321 SEMEN ANAL SPERM DETECTION: CPT | Performed by: FAMILY MEDICINE

## 2021-05-10 ENCOUNTER — OFFICE VISIT (OUTPATIENT)
Dept: FAMILY MEDICINE | Facility: CLINIC | Age: 36
End: 2021-05-10

## 2021-05-10 VITALS
OXYGEN SATURATION: 98 % | HEART RATE: 76 BPM | SYSTOLIC BLOOD PRESSURE: 100 MMHG | HEIGHT: 72 IN | RESPIRATION RATE: 16 BRPM | BODY MASS INDEX: 24.11 KG/M2 | DIASTOLIC BLOOD PRESSURE: 79 MMHG | WEIGHT: 178 LBS | TEMPERATURE: 97.9 F

## 2021-05-10 DIAGNOSIS — M54.16 LUMBAR RADICULOPATHY: Primary | ICD-10-CM

## 2021-05-10 PROCEDURE — 99214 OFFICE O/P EST MOD 30 MIN: CPT | Performed by: NURSE PRACTITIONER

## 2021-05-10 RX ORDER — PREDNISONE 20 MG/1
40 TABLET ORAL DAILY
Qty: 10 TABLET | Refills: 0 | Status: SHIPPED | OUTPATIENT
Start: 2021-05-10 | End: 2021-05-15

## 2021-05-10 ASSESSMENT — MIFFLIN-ST. JEOR: SCORE: 1780.4

## 2021-05-10 NOTE — PATIENT INSTRUCTIONS
I have prescribed you a short course of Prednisone to help decrease the inflammation in the area of the spasm. This should help to decrease associated numbness/tingling (nerve pain). Please take as directed. Be aware that this medication can cause insomnia, excitability, excessive hunger.  Please take it in the morning with food.    Do not take ibuprofen while on the steroid. If having pain, take Tylenol up to 1000mg, 4 times daily.    You may use the Zanaflex as needed for muscle spasm. Use caution while taking this medication, as it can make you drowsy. Do not take while driving, operating heavy machinery, or doing any activities requiring intense concentration.    Try using a heating pad and/or warm baths.    Make sure to keep moving to avoid getting stiff. See below for stretching exercises.    If you develop fever, severe pain that prevents you from walking at all, weakness of your arms or legs, loss of bowel or bladder continence, or any other new concerning symptoms, go to the ER immediately.    Otherwise, follow up with primary care doctor as needed or if no improvement in pain in symptoms in 1 week.

## 2021-05-10 NOTE — PROGRESS NOTES
Assessment & Plan     Lumbar radiculopathy  This has been ongoing since 2008. [facility], however he is not happy with his care there. Has not had imaging in quite some time. Has left leg radicular symptoms. No epidural injections since 2010, no PT since 2014 but states did not work. Will update MRI, and make recommendations based on those results. For now will try short course of prednisone, tizanidine.   - MR Lumbar Spine w/o Contrast; Future  - predniSONE (DELTASONE) 20 MG tablet; Take 2 tablets (40 mg) by mouth daily for 5 days  - tiZANidine (ZANAFLEX) 4 MG tablet; Take 1 tablet (4 mg) by mouth 3 times daily         Patient Instructions   I have prescribed you a short course of Prednisone to help decrease the inflammation in the area of the spasm. This should help to decrease associated numbness/tingling (nerve pain). Please take as directed. Be aware that this medication can cause insomnia, excitability, excessive hunger.  Please take it in the morning with food.    Do not take ibuprofen while on the steroid. If having pain, take Tylenol up to 1000mg, 4 times daily.    You may use the Zanaflex as needed for muscle spasm. Use caution while taking this medication, as it can make you drowsy. Do not take while driving, operating heavy machinery, or doing any activities requiring intense concentration.    Try using a heating pad and/or warm baths.    Make sure to keep moving to avoid getting stiff. See below for stretching exercises.    If you develop fever, severe pain that prevents you from walking at all, weakness of your arms or legs, loss of bowel or bladder continence, or any other new concerning symptoms, go to the ER immediately.    Otherwise, follow up with primary care doctor as needed or if no improvement in pain in symptoms in 1 week.        Return in about 1 week (around 5/17/2021).    [facility]    Elvia Price is a 35 year old  who presents for the following health issues     HPI     Chronic Pain Initial Visit    Where in your body do you have pain? Back  When did you first notice your pain? More than 5 years ago  How would you describe your pain? Aching, Burning, Exhausting, Gnawing, Miserable, Nagging, Numb, Sharp, Shooting and Stabbing   How has your pain affected your ability to work? Patient works, but is in constant pain  What makes your pain worse? Bending, walking, twisting, standing  Which of these pain treatments have you tried? Chiropractor, Physical Therapy and Other: Epidural x3  Have you had a significant life event? Other: Injured back in [place]  Do you have any symptoms of anxiety, such as panic attacks, periods of constant worry, or not being able to turn off your thoughts? YES  How well are you sleeping? Poor      History of chemical dependency:  No  Social History     Tobacco Use     Smoking status: Former Smoker     Smokeless tobacco: Former User   Substance Use Topics     Alcohol use: Yes     Comment: Moderate use.     Drug use: None     Above HPI reviewed. Additionally, has had ongoing issues with lumbar radiculopathy since 2008. Cannot recall injury, was in [place]. [facility], frustrated with care, cannot get a call back. Not currently on any medication. Had CAROLYN x 3 in 2010 with no relief. PT in 2014 with nor relief. Pain is worsening over past few years. Left leg with paresthesias most of the time. No weakness. No loss of bowel or bladder control. No saddle anesthesia.          Review of Systems   Constitutional, HEENT, cardiovascular, pulmonary, gi and gu systems are negative, except as otherwise noted.      Objective    /79   Pulse 76   Temp 97.9  F (36.6  C) (Tympanic)   Resp 16   Ht 1.829 m (6')   Wt 80.7 kg (178 lb)   SpO2 98%   BMI 24.14 kg/m    Body mass index is 24.14 kg/m .  Physical Exam   General Appearance:  Alert, cooperative, no distress, appears stated age. Afebrile. Appears comfortable  sitting in chair. Rises from seated position without difficulty.  Integument: Warm, dry, no rashes or lesions.  HEENT: Atraumatic, normocephalic. Face nontraumatic. Conjunctiva clear, Lids normal.  Neck: Supple, no meningismus. No Cspine tenderness. Neck ROM intact.  Respiratory: No distress.   Cardiovascular: Regular rate  Abdomen: soft, nontender, non-distended. No masses. No CVAT.  Musculoskeletal: Back: No Lspine or Tspine tenderness or crepitus to palpation. Mild TTP bilateral lumbar paraspinal musculature. Strength testing: hip flexion, extension 5/5 bilaterally, knee flexion/extension 5/5 bilaterally, ankle plantar/dorsiflexion 5/5 bilaterally.  Straight leg raise negative. Gait: observed, no antalgia or abnormalities. Steady gait. 2+ bilateral pedal pulses.  Normal toe raise, heel walk. Normal flexion and extension of toes.  Neurologic: Alert and orientated appropriately. No focal deficits. Sensation intact in distal LEs. DTRs: patellar 2+ bilaterally, achilles 2+ bilaterally.  Psych: Normal mood and affect.

## 2021-05-17 ENCOUNTER — HOSPITAL ENCOUNTER (OUTPATIENT)
Dept: MRI IMAGING | Facility: CLINIC | Age: 36
Discharge: HOME | End: 2021-05-17
Attending: NURSE PRACTITIONER | Admitting: NURSE PRACTITIONER
Payer: COMMERCIAL

## 2021-05-17 DIAGNOSIS — M54.16 LUMBAR RADICULOPATHY: ICD-10-CM

## 2021-05-17 PROCEDURE — 72148 MRI LUMBAR SPINE W/O DYE: CPT

## 2021-05-18 DIAGNOSIS — M54.16 LUMBAR RADICULOPATHY: Primary | ICD-10-CM

## 2022-06-07 ENCOUNTER — OFFICE VISIT (OUTPATIENT)
Dept: URGENT CARE | Facility: URGENT CARE | Age: 37
End: 2022-06-07
Payer: COMMERCIAL

## 2022-06-07 VITALS
HEART RATE: 63 BPM | DIASTOLIC BLOOD PRESSURE: 70 MMHG | TEMPERATURE: 97.8 F | BODY MASS INDEX: 23.46 KG/M2 | WEIGHT: 173 LBS | OXYGEN SATURATION: 100 % | SYSTOLIC BLOOD PRESSURE: 115 MMHG

## 2022-06-07 DIAGNOSIS — K11.8 PAROTID GLAND PAIN: Primary | ICD-10-CM

## 2022-06-07 PROCEDURE — 99213 OFFICE O/P EST LOW 20 MIN: CPT | Performed by: PHYSICIAN ASSISTANT

## 2022-06-07 RX ORDER — NAPROXEN 500 MG/1
500 TABLET ORAL 2 TIMES DAILY WITH MEALS
Qty: 30 TABLET | Refills: 0 | Status: SHIPPED | OUTPATIENT
Start: 2022-06-07

## 2022-06-07 NOTE — PROGRESS NOTES
Assessment & Plan     Parotid gland pain  Will treat with naproxen (NAPROSYN) 500 MG tablet; Take 1 tablet (500 mg) by mouth 2 times daily (with meals). Return to clinic if symptoms worsen or do not improve; otherwise follow up as needed                   Return in about 1 week (around 6/14/2022), or if symptoms worsen or fail to improve.    MUN[facility]              Subjective   Chief Complaint   Patient presents with     Ear Problem     Last Wednesday went to dentist for mouth pain on the left, they said it was nothing.  Then on Saturday felt left ear pain that shoots into his jaw.  Had crusty stuff out of his ear this morning. A little nausea.         HPI     Ear problem     Onset of symptoms was 1 week(s) ago.  Course of illness is same.    Severity moderate  Current and Associated symptoms: left ear pain/jaw pain  Treatment measures tried include None tried.  Predisposing factors include None.                Review of Systems   Constitutional, HEENT, cardiovascular, pulmonary, gi and gu systems are negative, except as otherwise noted.      Objective    /70   Pulse 63   Temp 97.8  F (36.6  C) (Tympanic)   Wt 78.5 kg (173 lb)   SpO2 100%   BMI 23.46 kg/m    Body mass index is 23.46 kg/m .  Physical Exam  Constitutional:       General: He is not in acute distress.     Appearance: He is well-developed.   HENT:      Head: Normocephalic and atraumatic.        Comments: Tenderness with palpation in left parotid gland area. No redness, warmth or swelling      Right Ear: Tympanic membrane and ear canal normal.      Left Ear: Tympanic membrane and ear canal normal.   Eyes:      Conjunctiva/sclera: Conjunctivae normal.   Cardiovascular:      Rate and Rhythm: Normal rate and regular rhythm.   Pulmonary:      Effort: Pulmonary effort is normal.      Breath sounds: Normal breath sounds.   Skin:     General: Skin is warm and dry.      Findings: No rash.    Psychiatric:         Behavior: Behavior normal.

## 2023-05-02 ENCOUNTER — TELEPHONE (OUTPATIENT)
Dept: FAMILY MEDICINE | Facility: CLINIC | Age: 38
End: 2023-05-02
Payer: COMMERCIAL

## 2023-05-02 NOTE — TELEPHONE ENCOUNTER
Reason for Call:  Appointment Request    Patient requesting this type of appt:  Feeling urgency due to weight loss    Requested provider: Ro Washington appt sked with. No primary    Reason patient unable to be scheduled: Not within requested timeframe    When does patient want to be seen/preferred time: 3-7 days    Comments: Sense of urgency    Okay to leave a detailed message?: Yes at Cell number on file:    Telephone Information:   Mobile [phone]       Call taken on 5/2/2023 at 3:06 PM by Domonique Galvez

## 2023-05-05 ENCOUNTER — OFFICE VISIT (OUTPATIENT)
Dept: FAMILY MEDICINE | Facility: CLINIC | Age: 38
End: 2023-05-05
Payer: COMMERCIAL

## 2023-05-05 VITALS
BODY MASS INDEX: 21.77 KG/M2 | WEIGHT: 160.7 LBS | OXYGEN SATURATION: 99 % | HEART RATE: 80 BPM | RESPIRATION RATE: 16 BRPM | TEMPERATURE: 98 F | SYSTOLIC BLOOD PRESSURE: 126 MMHG | HEIGHT: 72 IN | DIASTOLIC BLOOD PRESSURE: 78 MMHG

## 2023-05-05 DIAGNOSIS — M62.89 MUSCLE STIFFNESS: ICD-10-CM

## 2023-05-05 DIAGNOSIS — Z82.61 FAMILY HISTORY OF RHEUMATOID ARTHRITIS: ICD-10-CM

## 2023-05-05 DIAGNOSIS — M25.50 MULTIPLE JOINT PAIN: ICD-10-CM

## 2023-05-05 DIAGNOSIS — F31.9 BIPOLAR AFFECTIVE DISORDER, REMISSION STATUS UNSPECIFIED (H): ICD-10-CM

## 2023-05-05 DIAGNOSIS — Z11.59 NEED FOR HEPATITIS C SCREENING TEST: ICD-10-CM

## 2023-05-05 DIAGNOSIS — Z11.4 SCREENING FOR HIV (HUMAN IMMUNODEFICIENCY VIRUS): ICD-10-CM

## 2023-05-05 DIAGNOSIS — R63.4 UNINTENTIONAL WEIGHT LOSS: Primary | ICD-10-CM

## 2023-05-05 LAB
ALBUMIN SERPL BCG-MCNC: 4.7 G/DL (ref 3.5–5.2)
ALP SERPL-CCNC: 66 U/L (ref 40–129)
ALT SERPL W P-5'-P-CCNC: 22 U/L (ref 10–50)
ANION GAP SERPL CALCULATED.3IONS-SCNC: 7 MMOL/L (ref 7–15)
AST SERPL W P-5'-P-CCNC: 17 U/L (ref 10–50)
BASOPHILS # BLD AUTO: 0.1 10E3/UL (ref 0–0.2)
BASOPHILS NFR BLD AUTO: 1 %
BILIRUB SERPL-MCNC: 0.4 MG/DL
BUN SERPL-MCNC: 16.5 MG/DL (ref 6–20)
CALCIUM SERPL-MCNC: 9.3 MG/DL (ref 8.6–10)
CHLORIDE SERPL-SCNC: 105 MMOL/L (ref 98–107)
CHOLEST SERPL-MCNC: 152 MG/DL
CREAT SERPL-MCNC: 0.94 MG/DL (ref 0.67–1.17)
CRP SERPL-MCNC: <3 MG/L
DEPRECATED HCO3 PLAS-SCNC: 29 MMOL/L (ref 22–29)
EOSINOPHIL # BLD AUTO: 0.1 10E3/UL (ref 0–0.7)
EOSINOPHIL NFR BLD AUTO: 2 %
ERYTHROCYTE [DISTWIDTH] IN BLOOD BY AUTOMATED COUNT: 13.1 % (ref 10–15)
ERYTHROCYTE [SEDIMENTATION RATE] IN BLOOD BY WESTERGREN METHOD: 5 MM/HR (ref 0–15)
GFR SERPL CREATININE-BSD FRML MDRD: >90 ML/MIN/1.73M2
GLUCOSE SERPL-MCNC: 66 MG/DL (ref 70–99)
HCT VFR BLD AUTO: 42.4 % (ref 40–53)
HDLC SERPL-MCNC: 58 MG/DL
HGB BLD-MCNC: 14.2 G/DL (ref 13.3–17.7)
IMM GRANULOCYTES # BLD: 0 10E3/UL
IMM GRANULOCYTES NFR BLD: 0 %
LDLC SERPL CALC-MCNC: 77 MG/DL
LYMPHOCYTES # BLD AUTO: 1.4 10E3/UL (ref 0.8–5.3)
LYMPHOCYTES NFR BLD AUTO: 32 %
MAGNESIUM SERPL-MCNC: 2.1 MG/DL (ref 1.7–2.3)
MCH RBC QN AUTO: 29 PG (ref 26.5–33)
MCHC RBC AUTO-ENTMCNC: 33.5 G/DL (ref 31.5–36.5)
MCV RBC AUTO: 87 FL (ref 78–100)
MONOCYTES # BLD AUTO: 0.6 10E3/UL (ref 0–1.3)
MONOCYTES NFR BLD AUTO: 13 %
NEUTROPHILS # BLD AUTO: 2.3 10E3/UL (ref 1.6–8.3)
NEUTROPHILS NFR BLD AUTO: 52 %
NONHDLC SERPL-MCNC: 94 MG/DL
PHOSPHATE SERPL-MCNC: 3.6 MG/DL (ref 2.5–4.5)
PLATELET # BLD AUTO: 239 10E3/UL (ref 150–450)
POTASSIUM SERPL-SCNC: 4.3 MMOL/L (ref 3.4–5.3)
PROT SERPL-MCNC: 7 G/DL (ref 6.4–8.3)
RBC # BLD AUTO: 4.9 10E6/UL (ref 4.4–5.9)
SODIUM SERPL-SCNC: 141 MMOL/L (ref 136–145)
TRIGL SERPL-MCNC: 85 MG/DL
TSH SERPL DL<=0.005 MIU/L-ACNC: 0.59 UIU/ML (ref 0.3–4.2)
WBC # BLD AUTO: 4.4 10E3/UL (ref 4–11)

## 2023-05-05 PROCEDURE — 86140 C-REACTIVE PROTEIN: CPT | Performed by: STUDENT IN AN ORGANIZED HEALTH CARE EDUCATION/TRAINING PROGRAM

## 2023-05-05 PROCEDURE — 99214 OFFICE O/P EST MOD 30 MIN: CPT | Performed by: STUDENT IN AN ORGANIZED HEALTH CARE EDUCATION/TRAINING PROGRAM

## 2023-05-05 PROCEDURE — 85652 RBC SED RATE AUTOMATED: CPT | Performed by: STUDENT IN AN ORGANIZED HEALTH CARE EDUCATION/TRAINING PROGRAM

## 2023-05-05 PROCEDURE — 80061 LIPID PANEL: CPT | Performed by: STUDENT IN AN ORGANIZED HEALTH CARE EDUCATION/TRAINING PROGRAM

## 2023-05-05 PROCEDURE — 80050 GENERAL HEALTH PANEL: CPT | Performed by: STUDENT IN AN ORGANIZED HEALTH CARE EDUCATION/TRAINING PROGRAM

## 2023-05-05 PROCEDURE — 86200 CCP ANTIBODY: CPT | Performed by: STUDENT IN AN ORGANIZED HEALTH CARE EDUCATION/TRAINING PROGRAM

## 2023-05-05 PROCEDURE — 36415 COLL VENOUS BLD VENIPUNCTURE: CPT | Performed by: STUDENT IN AN ORGANIZED HEALTH CARE EDUCATION/TRAINING PROGRAM

## 2023-05-05 PROCEDURE — 83735 ASSAY OF MAGNESIUM: CPT | Performed by: STUDENT IN AN ORGANIZED HEALTH CARE EDUCATION/TRAINING PROGRAM

## 2023-05-05 PROCEDURE — 87389 HIV-1 AG W/HIV-1&-2 AB AG IA: CPT | Performed by: STUDENT IN AN ORGANIZED HEALTH CARE EDUCATION/TRAINING PROGRAM

## 2023-05-05 PROCEDURE — 84100 ASSAY OF PHOSPHORUS: CPT | Performed by: STUDENT IN AN ORGANIZED HEALTH CARE EDUCATION/TRAINING PROGRAM

## 2023-05-05 PROCEDURE — 86803 HEPATITIS C AB TEST: CPT | Performed by: STUDENT IN AN ORGANIZED HEALTH CARE EDUCATION/TRAINING PROGRAM

## 2023-05-05 PROCEDURE — 86431 RHEUMATOID FACTOR QUANT: CPT | Performed by: STUDENT IN AN ORGANIZED HEALTH CARE EDUCATION/TRAINING PROGRAM

## 2023-05-05 ASSESSMENT — PAIN SCALES - GENERAL: PAINLEVEL: MILD PAIN (2)

## 2023-05-05 NOTE — LETTER
May 8, 2023      Albert Ray  [address] [address] [facility] [address]        Dear ,    We are writing to inform you of your test results.    It is a pleasure providing you with medical care. I have received and reviewed your lab results, and have the following recommendations:     Your labs are all within normal limits. You did have a mildly decreased glucose value, but nothing concerning given your clinical presentation. We are still waiting for the results of your CCP and RF to assess your risk for autoimmune disease.     Resulted Orders   HIV Antigen Antibody Combo   Result Value Ref Range    HIV Antigen Antibody Combo Nonreactive Nonreactive      Comment:      HIV-1 p24 Ag & HIV-1/HIV-2 Ab Not Detected   Hepatitis C Screen Reflex to HCV RNA Quant and Genotype   Result Value Ref Range    Hepatitis C Antibody Nonreactive Nonreactive    Narrative    Assay performance characteristics have not been established for newborns, infants, and children.   Lipid panel reflex to direct LDL Non-fasting   Result Value Ref Range    Cholesterol 152 <200 mg/dL    Triglycerides 85 <150 mg/dL    Direct Measure HDL 58 >=40 mg/dL    LDL Cholesterol Calculated 77 <=100 mg/dL    Non HDL Cholesterol 94 <130 mg/dL    Narrative    Cholesterol  Desirable:  <200 mg/dL    Triglycerides  Normal:  Less than 150 mg/dL  Borderline High:  150-199 mg/dL  High:  200-499 mg/dL  Very High:  Greater than or equal to 500 mg/dL    Direct Measure HDL  Female:  Greater than or equal to 50 mg/dL   Male:  Greater than or equal to 40 mg/dL    LDL Cholesterol  Desirable:  <100mg/dL  Above Desirable:  100-129 mg/dL   Borderline High:  130-159 mg/dL   High:  160-189 mg/dL   Very High:  >= 190 mg/dL    Non HDL Cholesterol  Desirable:  130 mg/dL  Above Desirable:  130-159 mg/dL  Borderline High:  160-189 mg/dL  High:  190-219 mg/dL  Very High:  Greater than or equal to 220 mg/dL   TSH with free T4 reflex   Result Value Ref Range    TSH 0.59 0.30 -  4.20 uIU/mL   Comprehensive metabolic panel (BMP + Alb, Alk Phos, ALT, AST, Total. Bili, TP)   Result Value Ref Range    Sodium 141 136 - 145 mmol/L    Potassium 4.3 3.4 - 5.3 mmol/L    Chloride 105 98 - 107 mmol/L    Carbon Dioxide (CO2) 29 22 - 29 mmol/L    Anion Gap 7 7 - 15 mmol/L    Urea Nitrogen 16.5 6.0 - 20.0 mg/dL    Creatinine 0.94 0.67 - 1.17 mg/dL    Calcium 9.3 8.6 - 10.0 mg/dL    Glucose 66 (L) 70 - 99 mg/dL    Alkaline Phosphatase 66 40 - 129 U/L    AST 17 10 - 50 U/L    ALT 22 10 - 50 U/L    Protein Total 7.0 6.4 - 8.3 g/dL    Albumin 4.7 3.5 - 5.2 g/dL    Bilirubin Total 0.4 <=1.2 mg/dL    GFR Estimate >90 >60 mL/min/1.73m2      Comment:      eGFR calculated using 2021 CKD-EPI equation.   ESR: Erythrocyte sedimentation rate   Result Value Ref Range    Erythrocyte Sedimentation Rate 5 0 - 15 mm/hr   CRP, inflammation   Result Value Ref Range    CRP Inflammation <3.00 <5.00 mg/L   Magnesium   Result Value Ref Range    Magnesium 2.1 1.7 - 2.3 mg/dL   Phosphorus   Result Value Ref Range    Phosphorus 3.6 2.5 - 4.5 mg/dL   CBC with platelets and differential   Result Value Ref Range    WBC Count 4.4 4.0 - 11.0 10e3/uL    RBC Count 4.90 4.40 - 5.90 10e6/uL    Hemoglobin 14.2 13.3 - 17.7 g/dL    Hematocrit 42.4 40.0 - 53.0 %    MCV 87 78 - 100 fL    MCH 29.0 26.5 - 33.0 pg    MCHC 33.5 31.5 - 36.5 g/dL    RDW 13.1 10.0 - 15.0 %    Platelet Count 239 150 - 450 10e3/uL    % Neutrophils 52 %    % Lymphocytes 32 %    % Monocytes 13 %    % Eosinophils 2 %    % Basophils 1 %    % Immature Granulocytes 0 %    Absolute Neutrophils 2.3 1.6 - 8.3 10e3/uL    Absolute Lymphocytes 1.4 0.8 - 5.3 10e3/uL    Absolute Monocytes 0.6 0.0 - 1.3 10e3/uL    Absolute Eosinophils 0.1 0.0 - 0.7 10e3/uL    Absolute Basophils 0.1 0.0 - 0.2 10e3/uL    Absolute Immature Granulocytes 0.0 <=0.4 10e3/uL       If you have any questions or concerns, please call the clinic at the number listed above.       Sincerely,      Ro Washington,  MD

## 2023-05-05 NOTE — PROGRESS NOTES
1. Unintentional weight loss  2. Screening for HIV (human immunodeficiency virus)  3. Need for hepatitis C screening test  - HIV Antigen Antibody Combo  - Hepatitis C Screen Reflex to HCV RNA Quant and Genotype  - Lipid panel reflex to direct LDL Non-fasting; Future  - TSH with free T4 reflex; Future, family history of graves disease in patient's mother   - Comprehensive metabolic panel (BMP + Alb, Alk Phos, ALT, AST, Total. Bili, TP)  - CBC with platelets and differential to assess for potential lymphoma or hematological abnormalities     4. Multiple joint pain  5. Muscle stiffness  6. Family history of rheumatoid arthritis, grandfather and cousin have RA   - ESR: Erythrocyte sedimentation rate  - CRP, inflammation  - Rheumatoid factor  - Cyclic Citrullinated Peptide Antibody IgG  - Magnesium  - Phosphorus    7. Bipolar disorder  - patient aware to call clinic to update his bipolar medications and their doses since he didn't recall the name/dose at today's visit        Elvia Price is a 37 year old, presenting for the following health issues:  Weight Loss (Unintended weight loss) and Hand/wrist Stiffness (And pain)        5/5/2023    10:16 AM   Additional Questions   Roomed by Keyla DAVIS LPN   Accompanied by self         5/5/2023    10:16 AM   Patient Reported Additional Medications   Patient reports taking the following new medications Adderall 30 MG daily  Litho something for bipolar 10 or 20 mg daily - he is unsure of this med  quetiapine 10mg daily     History of Present Illness       Reason for visit:  Weight loss  Symptom onset:  More than a month (since before [place])  Symptoms include:  Weight loss - 15-20LBS since Christmas  Symptom intensity:  Moderate  Symptom progression:  Worsening  Had these symptoms before:  No  What makes it worse:  Eating seems to make him sick to his stomach now (but then other times he can eat as much as he wants)  What makes it better:  None    He eats 2-3 servings of  "fruits and vegetables daily.He consumes 2 sweetened beverage(s) daily.He exercises with enough effort to increase his heart rate 30 to 60 minutes per day.  He exercises with enough effort to increase his heart rate 5 days per week.   He is taking medications regularly.       Concern - Wrist and hand pain and swelling  Onset: since right before Christmas  Description: can't move the right wrist/hand. It will lock up. Swelling and pain  Intensity: moderate, severe pain currently 2/10, at it's worst 10/ 10 where he will start crying. Had severe 10/10 pain for about a month where he couldn't use his right wrist or hand at all. The left one has just started to hurt about a month ago  Progression of Symptoms:  same and intermittent  Accompanying Signs & Symptoms: no tingling, numbness, or weakness. It feels \"fat\" like it has a bunch of pressure on it  Previous history of similar problem: none  Precipitating factors:        Worsened by: ice/heat  Alleviating factors:        Improved by: none  Therapies tried and outcome: tylenol, ibuprofen, ice/heat - makes it all worse    Concern - Pain   Onset: about a month ago  Description: burning when he moves a certain way under his armpits and both thighs.  Intensity: moderate, 6/10 at it's worst  Progression of Symptoms:  worsening  Accompanying Signs & Symptoms: none  Previous history of similar problem: none  Precipitating factors:        Worsened by: stomping his feet makes his thighs hurt more. Moving certain ways will make the pain come on  Alleviating factors:        Improved by: none  Therapies tried and outcome:  none     Review of Systems   As above       Objective    /78 (BP Location: Left arm, Patient Position: Sitting, Cuff Size: Adult Regular)   Pulse 80   Temp 98  F (36.7  C) (Tympanic)   Resp 16   Ht 1.834 m (6' 0.21\")   Wt 72.9 kg (160 lb 11.2 oz)   SpO2 99%   BMI 21.67 kg/m    Body mass index is 21.67 kg/m .     Physical Exam  Constitutional:       " General: He is not in acute distress.     Appearance: Normal appearance.   HENT:      Head: Normocephalic and atraumatic.      Right Ear: External ear normal.      Left Ear: External ear normal.      Mouth/Throat:      Mouth: Mucous membranes are moist.      Pharynx: Oropharynx is clear. No oropharyngeal exudate or posterior oropharyngeal erythema.   Eyes:      General: No scleral icterus.        Right eye: No discharge.         Left eye: No discharge.      Extraocular Movements: Extraocular movements intact.      Conjunctiva/sclera: Conjunctivae normal.   Cardiovascular:      Rate and Rhythm: Normal rate and regular rhythm.      Heart sounds: Normal heart sounds.   Pulmonary:      Effort: Pulmonary effort is normal. No respiratory distress.      Breath sounds: Normal breath sounds. No wheezing or rhonchi.   Abdominal:      Palpations: Abdomen is soft. There is no mass.      Tenderness: There is no abdominal tenderness.   Musculoskeletal:         General: No swelling or tenderness. Normal range of motion.      Cervical back: Normal range of motion and neck supple.      Comments: Right extremity did appear somewhat weaker compared to the left when it came to  and finger strength (4/5 strength)    Skin:     General: Skin is warm.      Findings: No rash.      Comments: No rash on exposed skin   Neurological:      General: No focal deficit present.      Mental Status: He is alert and oriented to person, place, and time.   Psychiatric:         Mood and Affect: Mood normal.         Behavior: Behavior normal.

## 2023-05-06 LAB
HCV AB SERPL QL IA: NONREACTIVE
HIV 1+2 AB+HIV1 P24 AG SERPL QL IA: NONREACTIVE

## 2023-05-07 NOTE — RESULT ENCOUNTER NOTE
Sanya Ray,     It is a pleasure providing you with medical care. I have received and reviewed your lab results, and have the following recommendations:     Your labs are all within normal limits. You did have a mildly decreased glucose value, but nothing concerning given your clinical presentation. We are still waiting for the results of your CCP and RF to assess your risk for autoimmune disease.        Sincerely,     Ro Washington MD

## 2023-05-08 LAB — RHEUMATOID FACT SER NEPH-ACNC: <7 IU/ML

## 2023-05-10 LAB — CCP AB SER IA-ACNC: 1.6 U/ML

## 2023-05-11 RX ORDER — DEXTROAMPHETAMINE SACCHARATE, AMPHETAMINE ASPARTATE MONOHYDRATE, DEXTROAMPHETAMINE SULFATE AND AMPHETAMINE SULFATE 7.5; 7.5; 7.5; 7.5 MG/1; MG/1; MG/1; MG/1
30 CAPSULE, EXTENDED RELEASE ORAL DAILY
COMMUNITY

## 2023-05-11 RX ORDER — QUETIAPINE FUMARATE 50 MG/1
10 TABLET, FILM COATED ORAL AT BEDTIME
COMMUNITY

## 2023-05-11 RX ORDER — LAMOTRIGINE 100 MG/1
100 TABLET ORAL DAILY
COMMUNITY

## 2023-05-11 NOTE — RESULT ENCOUNTER NOTE
Sanya Price,     Your Cyclic Citrullinated Peptide Antibody was negative as was your Rheumatoid Factor. It is unlikely that your symptoms are due to autoimmune disease like rheumatoid arthritis.     Sincerely,     Ro Washington MD

## 2023-10-06 ENCOUNTER — OFFICE VISIT (OUTPATIENT)
Dept: URGENT CARE | Facility: URGENT CARE | Age: 38
End: 2023-10-06
Payer: COMMERCIAL

## 2023-10-06 VITALS
SYSTOLIC BLOOD PRESSURE: 123 MMHG | DIASTOLIC BLOOD PRESSURE: 79 MMHG | TEMPERATURE: 97.1 F | BODY MASS INDEX: 21.17 KG/M2 | HEART RATE: 81 BPM | OXYGEN SATURATION: 98 % | WEIGHT: 157 LBS

## 2023-10-06 DIAGNOSIS — M26.609 TMJ (TEMPOROMANDIBULAR JOINT SYNDROME): Primary | ICD-10-CM

## 2023-10-06 PROCEDURE — 99213 OFFICE O/P EST LOW 20 MIN: CPT | Mod: 25 | Performed by: FAMILY MEDICINE

## 2023-10-06 PROCEDURE — 96372 THER/PROPH/DIAG INJ SC/IM: CPT | Performed by: FAMILY MEDICINE

## 2023-10-06 RX ORDER — CYCLOBENZAPRINE HCL 10 MG
10 TABLET ORAL 3 TIMES DAILY PRN
Qty: 30 TABLET | Refills: 1 | Status: SHIPPED | OUTPATIENT
Start: 2023-10-06 | End: 2023-10-16

## 2023-10-06 RX ORDER — KETOROLAC TROMETHAMINE 30 MG/ML
60 INJECTION, SOLUTION INTRAMUSCULAR; INTRAVENOUS ONCE
Status: COMPLETED | OUTPATIENT
Start: 2023-10-06 | End: 2023-10-06

## 2023-10-06 RX ADMIN — KETOROLAC TROMETHAMINE 60 MG: 30 INJECTION, SOLUTION INTRAMUSCULAR; INTRAVENOUS at 13:52

## 2023-10-06 NOTE — PROGRESS NOTES
Assessment & Plan     TMJ (temporomandibular joint syndrome)  Recommended physical therapy for this   - ketorolac (TORADOL) injection 60 mg  - cyclobenzaprine (FLEXERIL) 10 MG tablet  Dispense: 30 tablet; Refill: 1                 Arianna Jaramillo MD  [facility]    Elvia Price is a 38 year old male who presents to clinic today for the following health issues:  Chief Complaint   Patient presents with    Ear Problem     Says left ear and left jaw, started about 2 wks ago, now says it is mostly jaw that hurts. Says hurts when he moves his neck.      HPI    Has had pain in the left side of scalp and now in to the jaw. Started 2 weeks ago and he has been nauseated he has no fever no cough pain with left side of neck with pain on rotation and extendiosn  He has been taking ibu     Review of Systems  As above       Objective    /79   Pulse 81   Temp 97.1  F (36.2  C) (Tympanic)   Wt 71.2 kg (157 lb)   SpO2 98%   BMI 21.17 kg/m    Physical Exam   GENERAL: healthy, alert and no distress  EYES: Eyes grossly normal to inspection, PERRL and conjunctivae and sclerae normal  HENT: ear canals and TM's normal, nose and mouth without ulcers or lesions left tmj is exquisitely tender denies locking or teeth grinding  NECK: no adenopathy, no asymmetry, masses, or scars and thyroid normal to palpation  RESP: lungs clear to auscultation - no rales, rhonchi or wheezes  CV: regular rate and rhythm, normal S1 S2, no S3 or S4, no murmur, click or rub, no peripheral edema and peripheral pulses strong    No results found for any visits on 10/06/23.      1. TMJ (temporomandibular joint syndrome)    - ketorolac (TORADOL) injection 60 mg  - cyclobenzaprine (FLEXERIL) 10 MG tablet; Take 1 tablet (10 mg) by mouth 3 times daily as needed for muscle spasms  Dispense: 30 tablet; Refill: 1  Arianna Jaramillo M.D.